# Patient Record
Sex: FEMALE | Race: ASIAN | NOT HISPANIC OR LATINO
[De-identification: names, ages, dates, MRNs, and addresses within clinical notes are randomized per-mention and may not be internally consistent; named-entity substitution may affect disease eponyms.]

---

## 2022-12-13 ENCOUNTER — APPOINTMENT (OUTPATIENT)
Dept: UROLOGY | Facility: CLINIC | Age: 62
End: 2022-12-13

## 2022-12-13 VITALS
TEMPERATURE: 97.7 F | RESPIRATION RATE: 16 BRPM | SYSTOLIC BLOOD PRESSURE: 148 MMHG | HEART RATE: 87 BPM | DIASTOLIC BLOOD PRESSURE: 94 MMHG | OXYGEN SATURATION: 99 % | HEIGHT: 60 IN | BODY MASS INDEX: 24.15 KG/M2 | WEIGHT: 123 LBS

## 2022-12-13 DIAGNOSIS — N28.9 DISORDER OF KIDNEY AND URETER, UNSPECIFIED: ICD-10-CM

## 2022-12-13 DIAGNOSIS — R82.81 PYURIA: ICD-10-CM

## 2022-12-13 DIAGNOSIS — Z78.9 OTHER SPECIFIED HEALTH STATUS: ICD-10-CM

## 2022-12-13 DIAGNOSIS — Z00.00 ENCOUNTER FOR GENERAL ADULT MEDICAL EXAMINATION W/OUT ABNORMAL FINDINGS: ICD-10-CM

## 2022-12-13 PROCEDURE — 99203 OFFICE O/P NEW LOW 30 MIN: CPT

## 2022-12-13 NOTE — ADDENDUM
[FreeTextEntry1] : Entered by TIA SANCHES, acting as scribe for Dr. Reinier Polanco.\par The documentation recorded by the scribe accurately reflects the service I personally performed and the decisions made by me.

## 2022-12-13 NOTE — LETTER BODY
[FreeTextEntry1] : Hussein Rodriguez DO\par [address] [facility] [address],\[place] [address]\par [phone]\par \par Dear Dr. Rodriguez,\par \par Reason for visit: Abnormal urinalysis. Possible pyuria. Renal insufficiency\par \par This is a 61 year-old Mandarin speaking woman with history of renal insufficiency and abnormal urinalysis, pyuria referred for evaluation. On the urinalysis report, there is no evidence of hematuria. However, on urine microscopy there was evidence of white blood cells. Her CMP from November 2022 demonstrated renal insufficiency, creatinine 1.61. Patient is entirely asymptomatic. She denies any gross hematuria, dysuria or urinary incontinence. The patient does not smoke. The patient denies any aggravating or relieving factors. The patient denies any interference of function. The patient is entirely asymptomatic.All other review of systems are negative. She has no cancer in her family medical history. She has no previous surgical history. Past medical history, family history and social history were inquired and were noncontributory to current condition. The patient does not use tobacco or drink alcohol. Medications and allergies were reviewed. She has no known allergies to medication.\par \par On examination, the patient is a healthy-appearing woman in no acute distress. She is alert and oriented follows commands. She has normal mood and affect. She is normocephalic. Neck is supple. Respirations are unlabored. Abdomen is soft and nontender. Bladder is nonpalpable. No CVA tenderness. Neurologically she is grossly intact. No peripheral edema. Skin without gross abnormality.\par \par Her CMP from November 2022 demonstrated decreased renal functions, creatinine 1.61.\par \par Assessment: Abnormal urinalysis, possible pyuria, UTI. Renal insufficiency.\par \par I counseled the patient on the various etiologies of the pyuria. I discussed the risk. Given the absence of urinary symptoms, I recommended the patient repeat the urinalysis and obtain urine culture, Quantiferon plus TB, Chlamydia/GC amplification and ureaplasma/mycoplasma genital culture prior to treatment. In terms of her renal insufficiency, I recommended she repeat BMP and obtain renal ultrasound for further evaluation. I answered the patient's questions. The risks and expected outcomes were discussed. The patient will follow up as directed.\par \par Plan: Renal ultrasound. BMP. Urinalysis. Urine culture. Ureaplasma/mycoplasma genital culture. Chlamydia/GC amplification. Quantiferon plus TB. Follow up as directed.\par \par I personally reviewed ultrasound images with the patient today and images demonstrated bilateral kidneys appear slight hyperechoic compared with liver and spleen. Both kidneys appear normal in size. No stones, solid masses or hydronephrosis visualized.

## 2022-12-15 LAB
ANION GAP SERPL CALC-SCNC: 13 MMOL/L
BACTERIA UR CULT: NORMAL
BUN SERPL-MCNC: 23 MG/DL
C TRACH RRNA SPEC QL NAA+PROBE: NOT DETECTED
CALCIUM SERPL-MCNC: 9.2 MG/DL
CHLORIDE SERPL-SCNC: 104 MMOL/L
CO2 SERPL-SCNC: 23 MMOL/L
CREAT SERPL-MCNC: 1.43 MG/DL
EGFR: 41 ML/MIN/1.73M2
GLUCOSE SERPL-MCNC: 127 MG/DL
N GONORRHOEA RRNA SPEC QL NAA+PROBE: NOT DETECTED
POTASSIUM SERPL-SCNC: 4.4 MMOL/L
SODIUM SERPL-SCNC: 140 MMOL/L
SOURCE AMPLIFICATION: NORMAL

## 2022-12-17 LAB
M TB IFN-G BLD-IMP: NEGATIVE
QUANTIFERON TB PLUS MITOGEN MINUS NIL: 5.59 IU/ML
QUANTIFERON TB PLUS NIL: 0.02 IU/ML
QUANTIFERON TB PLUS TB1 MINUS NIL: 0 IU/ML
QUANTIFERON TB PLUS TB2 MINUS NIL: 0 IU/ML